# Patient Record
Sex: MALE | Race: BLACK OR AFRICAN AMERICAN | NOT HISPANIC OR LATINO | URBAN - METROPOLITAN AREA
[De-identification: names, ages, dates, MRNs, and addresses within clinical notes are randomized per-mention and may not be internally consistent; named-entity substitution may affect disease eponyms.]

---

## 2018-04-05 ENCOUNTER — TRANSCRIBE ORDERS (OUTPATIENT)
Dept: PHYSICAL THERAPY | Facility: CLINIC | Age: 63
End: 2018-04-05

## 2018-04-05 DIAGNOSIS — S86.912A KNEE STRAIN, LEFT, INITIAL ENCOUNTER: Primary | ICD-10-CM

## 2018-04-09 ENCOUNTER — TREATMENT (OUTPATIENT)
Dept: PHYSICAL THERAPY | Facility: CLINIC | Age: 63
End: 2018-04-09

## 2018-04-09 DIAGNOSIS — M25.562 ACUTE PAIN OF LEFT KNEE: Primary | ICD-10-CM

## 2018-04-09 PROCEDURE — 97110 THERAPEUTIC EXERCISES: CPT | Performed by: PHYSICAL THERAPIST

## 2018-04-09 PROCEDURE — 97161 PT EVAL LOW COMPLEX 20 MIN: CPT | Performed by: PHYSICAL THERAPIST

## 2018-04-09 NOTE — PROGRESS NOTES
Physical Therapy Initial Evaluation and Plan of Care        Subjective Evaluation    History of Present Illness  Mechanism of injury: Patient reports that he fell when coming down a ladder at work.  He continued to work the remainder of his shift but his knee continued to hurt.   He currently has difficulty with squatting and crouching.  Has to change how he is climbing a ladder.  Denies giving way but does have significant tenderness on the outside of his knee which is keeping him up at night.             Patient Occupation:     Precautions and Work Restrictions: work as tolerated. Quality of life: good    Pain  Relieving factors: ice and medications  Aggravating factors: squatting, stairs, ambulation, standing and repetitive movement (ladder climbing)  Progression: improved    Social Support  Lives with: alone    Treatments  No previous or current treatments           Objective       Tenderness   Left Knee   Tenderness in the fibular head, lateral joint line and LCL (distal).     Active Range of Motion   Left Knee   Flexion: 130 degrees   Extension: 0 degrees     Strength/Myotome Testing     Left Knee   Normal strength  Quadriceps contraction: good    Tests     Left Knee   Positive valgus stress test at 30 degrees and varus stress test at 30 degrees.   Negative lateral Iwona and medial Iwona.          Assessment & Plan     Assessment  Impairments: abnormal or restricted ROM, activity intolerance, lacks appropriate home exercise program and pain with function  Assessment details: Patient is a 62 year old male who presents with increased left knee pain after work related injury. Upon assessment he has full AROM of left knee, good quad set and 5/5 MMT.  He has significant tenderness at proximal fibular head with lateral joint line.  Negative testing for meniscal involvement however test positive for varus and valgus stress.    Prognosis: good  Prognosis details: Long Term Goals (4  weeks)    Patient is able to return to work/community activities with minimal to no discomfort  Patient is able to lift 45 lbs from floor to waist  Patient is able stand for as long as needed for work/community related tasks.   Patient is able to sit for as long as needed for work/community related tasks.   Patient is able to reciprocally climb steps as needed for daily tasks.   Patient is able to squat/crouch without issue.   Patient is able to safely climb ladder.         Short Term Goals (2 weeks)    Patient is independent with HEP  Patient is able to sleep without being disrupted by pain.   Patient has greater than 50% improvement overall.   Minimal tenderness at lateral joint line.             Functional Limitations: sleeping, moving in bed and stooping  Plan  Therapy options: will be seen for skilled physical therapy services  Planned modality interventions: TENS, ultrasound, thermotherapy (hydrocollator packs) and cryotherapy  Planned therapy interventions: manual therapy, soft tissue mobilization, strengthening, stretching, therapeutic activities, joint mobilization, home exercise program, functional ROM exercises, flexibility and body mechanics training  Frequency: 3x week  Duration in weeks: 4  Treatment plan discussed with: patient          Therapeutic Exercise:    18     mins  30276;     Iontophoresis               2   mins [id number]    Timed Treatment:   20   mins   Total Treatment:     50   mins    PT SIGNATURE: Roxanne Mendez, PT   DATE TREATMENT INITIATED: 4/9/2018    Initial Certification  Certification Period: 7/8/2018  I certify that the therapy services are furnished while this patient is under my care.  The services outlined above are required by this patient, and will be reviewed every 90 days.     PHYSICIAN: Roz Hoang PA-C      DATE:     Please sign and return via fax to [phone].. Thank you, [facility].

## 2018-04-11 ENCOUNTER — TREATMENT (OUTPATIENT)
Dept: PHYSICAL THERAPY | Facility: CLINIC | Age: 63
End: 2018-04-11

## 2018-04-11 DIAGNOSIS — M25.562 ACUTE PAIN OF LEFT KNEE: Primary | ICD-10-CM

## 2018-04-11 PROCEDURE — 97110 THERAPEUTIC EXERCISES: CPT | Performed by: PHYSICAL THERAPIST

## 2018-04-11 NOTE — PROGRESS NOTES
Physical Therapy Daily Progress Note            Subjective Evaluation    History of Present Illness    Subjective comment: Patient reports that he had some relief with the ionto patch.  Knee still feels weak and has pain at medial and lateral joint lines.        Objective   See Exercise, Manual, and Modality Logs for complete treatment.       Assessment & Plan     Assessment  Assessment details: Patient tolerated treatment well.  Near full AROM however has some mild tightness at end range extension and flexion.   Has increased tenderness at lateral joint line and tibial plateau.     Plan  Plan details: Progress as tolerated.  Reassess at next appointment.                        Therapeutic Exercise:    40     mins  61743;       Timed Treatment:   40   mins   Total Treatment:     52   mins    Roxanne Mendez, PT  Physical Therapist

## 2018-04-13 ENCOUNTER — TREATMENT (OUTPATIENT)
Dept: PHYSICAL THERAPY | Facility: CLINIC | Age: 63
End: 2018-04-13

## 2018-04-13 DIAGNOSIS — M25.562 ACUTE PAIN OF LEFT KNEE: Primary | ICD-10-CM

## 2018-04-13 PROCEDURE — 97110 THERAPEUTIC EXERCISES: CPT | Performed by: PHYSICAL THERAPIST

## 2018-04-13 PROCEDURE — 97112 NEUROMUSCULAR REEDUCATION: CPT | Performed by: PHYSICAL THERAPIST

## 2018-04-13 NOTE — PROGRESS NOTES
Re-Assessment / Re-Certification        Patient: Dre Veloz   : 1955  Diagnosis/ICD-10 Code:  Acute pain of left knee [M25.562]  Referring practitioner: Roz Hoang PA-C  Patient seen for 3 sessions      Subjective:   Dre Veloz reports:  Clinical Progress: unchanged  Home Program Compliance: Yes  Treatment has included: therapeutic exercise, neuromuscular re-education, iontophoresis and cryotherapy    Subjective Evaluation    History of Present Illness    Subjective comment: the pt reports he has a numbness feeling into his L lateral lower leg and has pain with prolong standing or performing a prolong activity.       Objective       Active Range of Motion     Additional Active Range of Motion Details  2018 findings:  Left Knee   Flexion: 130 degrees   Extension: 0 degrees     Palpation for tenderness: + with minimal depth of palpation along the medial and lateral knee and knee joint line, down the tibial shaft, and along the lateral and proximal lower leg.    Initial evaluation findings:  Left Knee   Flexion: 130 degrees   Extension: 0 degrees      Assessment & Plan     Assessment  Assessment details: The pt has made minimal improvements since his initial evaluation without change in ROM, continued palpation for tenderness around the knee and proximal lower leg, inability to complete work duties fully and pain with prolong activities: standing, walking, sitting, climbing stairs and ladder.   The pt reports there was subjective improvement in his abilities to perform some tasks at work better, but his pain relapsed and his is now about 30% improved from his initial condition.    The pt would continue to benefit from PT care to address his pain, lack in activity endurance, and weakness to perform work duties.  Prognosis details: Short Term Goals (2 weeks)    Patient is independent with HEP. MET  Patient is able to sleep without being disrupted by pain.  NOT MET, waking up 2-3x/night due to  pain  Patient has greater than 50% improvement overall. NOT MET, 30%  Minimal tenderness at lateral joint line.  NOT MET    Long Term Goals (4 weeks)    Patient is able to return to work/community activities with minimal to no discomfort. NOT MET  Patient is able to lift 45 lbs from floor to waist. NOT MET  Patient is able stand for as long as needed for work/community related tasks. NOT MET  Patient is able to sit for as long as needed for work/community related tasks. NOT MET  Patient is able to reciprocally climb steps as needed for daily tasks. NOT MET  Patient is able to squat/crouch without issue. NOT MET  Patient is able to safely climb ladder. NOT MET    Plan  Plan details: Continue skilled PT care to address functional limitations to return the pt to his PLOF, as appropriate.        Progress toward previous goals: Partially Met  Recommendations: Continue as planned  Timeframe: 1 month  Prognosis to achieve goals: good    PT Signature: Belen Rios PT      Based upon review of the patient's progress and continued therapy plan, it is my medical opinion that Dre Veloz should continue physical therapy treatment at [facility]  [address] [place] [id number] [phone].    Signature: __________________________________  Roz Hoang PA-C    Manual Therapy:    0     mins  96315;  Therapeutic Exercise:    20     mins  57748;     Neuromuscular Sonja:    32    mins  13276;    Therapeutic Activity:     0     mins  26647;     Gait Trainin     mins  46981;     Ultrasound:     0     mins  42022;    Work Hardening           0      mins 77427  Iontophoresis               3   mins 14400    Timed Treatment:   55   mins   Total Treatment:     65   mins

## 2018-04-17 ENCOUNTER — TREATMENT (OUTPATIENT)
Dept: PHYSICAL THERAPY | Facility: CLINIC | Age: 63
End: 2018-04-17

## 2018-04-17 DIAGNOSIS — M25.562 ACUTE PAIN OF LEFT KNEE: Primary | ICD-10-CM

## 2018-04-17 PROCEDURE — 97140 MANUAL THERAPY 1/> REGIONS: CPT | Performed by: PHYSICAL THERAPIST

## 2018-04-17 PROCEDURE — 97110 THERAPEUTIC EXERCISES: CPT | Performed by: PHYSICAL THERAPIST

## 2018-04-17 NOTE — PROGRESS NOTES
Physical Therapy Daily Progress Note            Subjective Evaluation    History of Present Illness    Subjective comment: patient reports that overall he feels as thought it is getting better but still has pain with twisting        Objective   See Exercise, Manual, and Modality Logs for complete treatment.       Assessment & Plan     Assessment  Assessment details: Patient tolerated treatment fairly well. She continues to have increased tenderness at proximal fibular head.  Had some fatigue at quad with SLR.                      Manual Therapy:    5     mins  41475;  Therapeutic Exercise:    45     mins  05680;       Iontophoresis               2   mins 58288    Timed Treatment:   52   mins   Total Treatment:     52   mins    Roxanne Mendez, PT  Physical Therapist

## 2018-04-18 ENCOUNTER — TREATMENT (OUTPATIENT)
Dept: PHYSICAL THERAPY | Facility: CLINIC | Age: 63
End: 2018-04-18

## 2018-04-18 DIAGNOSIS — M25.562 ACUTE PAIN OF LEFT KNEE: Primary | ICD-10-CM

## 2018-04-18 PROCEDURE — 97110 THERAPEUTIC EXERCISES: CPT | Performed by: PHYSICAL THERAPIST

## 2018-04-18 PROCEDURE — 97112 NEUROMUSCULAR REEDUCATION: CPT | Performed by: PHYSICAL THERAPIST

## 2018-04-18 PROCEDURE — 97140 MANUAL THERAPY 1/> REGIONS: CPT | Performed by: PHYSICAL THERAPIST

## 2018-04-18 NOTE — PROGRESS NOTES
"Physical Therapy Daily Progress Note            Subjective Evaluation    History of Present Illness    Subjective comment: The pt continues to report \"static\" improvement in his L knee pain.  The pt reports frustration in his lack of physical progress and continued pain.  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       Assessment & Plan     Assessment  Assessment details: The pt's knee pain was made slightly better after performing stretching between exercises.  The pt did report a slight improvement in his pain overall, but it was still not completely eliminated.  The pt left his appointment with reports he felt better than when he first came in.  The pt did inquire if it was ok for him to walk more and was told as long as he avoids pain and takes breaks as needed, there is not observable reason why he could not begin a gentle walking program.  The pt verbally agreed to keep the walking pain free.      Plan  Plan details: Continue skilled PT care to address functional limitations to return the pt to his PLOF, as appropriate.                       Manual Therapy:    11     mins  43974;  Therapeutic Exercise:    20     mins  18617;     Neuromuscular Sonja:    22    mins  72296;    Therapeutic Activity:     0     mins  59979;     Gait Trainin     mins  40680;     Ultrasound:     0     mins  79948;    Work Hardening           0      mins 78032  Iontophoresis               0   mins 08810    Timed Treatment:   53   mins   Total Treatment:     63   mins    Belen Rios, PT  Physical Therapist  "

## 2018-04-20 ENCOUNTER — TREATMENT (OUTPATIENT)
Dept: PHYSICAL THERAPY | Facility: CLINIC | Age: 63
End: 2018-04-20

## 2018-04-20 DIAGNOSIS — M25.562 ACUTE PAIN OF LEFT KNEE: Primary | ICD-10-CM

## 2018-04-20 PROCEDURE — 97110 THERAPEUTIC EXERCISES: CPT | Performed by: PHYSICAL THERAPIST

## 2018-04-20 PROCEDURE — 97112 NEUROMUSCULAR REEDUCATION: CPT | Performed by: PHYSICAL THERAPIST

## 2018-04-20 PROCEDURE — 97530 THERAPEUTIC ACTIVITIES: CPT | Performed by: PHYSICAL THERAPIST

## 2018-04-20 NOTE — PROGRESS NOTES
"Re-Assessment / Re-Certification        Patient: Dre Veloz   : 1955  Diagnosis/ICD-10 Code:  Acute pain of left knee [M25.562]  Referring practitioner: Roz Hoang PA-C  Patient seen for 6 sessions      Subjective:   Dre Veloz reports:  Clinical Progress: improved  Home Program Compliance: Yes  Treatment has included: therapeutic exercise, neuromuscular re-education, manual therapy, therapeutic activity and cryotherapy    Subjective Evaluation    History of Present Illness    Subjective comment: The pt continues to report \"static\" improvement in his L knee pain.  The pt reports frustration in his lack of physical progress and continued pain.  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 may have reaggravated his      Objective       Active Range of Motion     Additional Active Range of Motion Details  2018 findings:  Knee flex: 141 deg, sharp pain along lateral side of knee  Knee ext: 0 deg, sharp pain at area below lateral knee and a dull ache in medial knee    + for palpation with deep depth along knee joint line, at distal hamstring tendons, along distal ITB distribution and distal adductor muscle bellies    + Valgus and Varus at 0 deg  + Valgus at 30 deg, slight pain  - Varous at 30 deg      Initial Evaluation findings:  Tenderness   Left Knee   Tenderness in the fibular head, lateral joint line and LCL (distal).      Active Range of Motion   Left Knee   Flexion: 130 degrees   Extension: 0 degrees      Strength/Myotome Testing      Left Knee   Normal strength  Quadriceps contraction: good     Tests      Left Knee   Positive valgus stress test at 30 degrees and varus stress test at 30 degrees.   Negative lateral Iwona and medial Iwona.      Assessment & Plan     Assessment  Assessment details: The pt has improved slightly since his initial evaluation with an increase in knee flexion and reports he can sit as long as needed, but finds it more " comfortable to keep the knee straight vs bent.  The pt also demo the ability to complete gradually more exercises at each appointment.      However, the pt did report his knee pain continues to limit his abilities to complete all his work tasks and personal movements.  The pt demo in the clinic the ability to properly transfer 15# and 20# from waist <-> floor after visual and verbal cues, but the pt reported it increased his knee pain.  The pt also demo he could climb a ladder, but following his subjective report of needing to lower himself to the ground to enter into the ladder's cage, the pt reported it increased his pains.  Additionally, the pt reports he can stand for up to 3 hours, but at his PLOF was able to stand for 12 hours for work duties.    The pt would benefit from continued PT care to address his limitations for safety and strength needed for his work duties.  Prognosis details: Long Term Goals (4 weeks)    Patient is able to return to work/community activities with minimal to no discomfort. NO TMET  Patient is able to lift 45 lbs from floor to waist  Patient is able stand for as long as needed for work/community related tasks. NOT MET, only able to stand for 3 hours, reports he was standing for 12 hours.  Patient is able to sit for as long as needed for work/community related tasks. MET, but needs to keep the leg straight or moving to prevent it from feeling stiff.  Patient is able to reciprocally climb steps as needed for daily tasks. NOT MET, may start off with reciprocal amb, but will resort back to step-to-step  Patient is able to squat/crouch without issue. NOT MET  Patient is able to safely climb ladder. MET        Short Term Goals (2 weeks)    Patient is independent with HEP. MET  Patient is able to sleep without being disrupted by pain. NOT MET, 2-3 hrs of sleep before being woken up  Patient has greater than 50% improvement overall. NOT MET, subjective report 45%  Minimal tenderness at  lateral joint line.  NOT MET    Plan  Plan details: Please advise.      Progress toward previous goals: Partially Met  Recommendations: Continue as planned    PT Signature: Belen Rios PT      Based upon review of the patient's progress and continued therapy plan, it is my medical opinion that Dre Veloz should continue physical therapy treatment at [facility]  [address] [place] [id number] [phone].    Signature: __________________________________  Roz Hoang PA-C    Manual Therapy:    0     mins  44104;  Therapeutic Exercise:    32     mins  71738;     Neuromuscular Sonja:    8    mins  35092;    Therapeutic Activity:     8     mins  46136;     Gait Trainin     mins  65268;     Ultrasound:     0     mins  11153;    Work Hardening           0      mins 69793  Iontophoresis               3   mins 32088    Timed Treatment:   60   mins   Total Treatment:     70   mins

## 2022-09-17 ENCOUNTER — HOSPITAL ENCOUNTER (EMERGENCY)
Dept: HOSPITAL 15 - ER | Age: 67
LOS: 1 days | Discharge: ANOTHER HOSPITAL | End: 2022-09-18
Payer: COMMERCIAL

## 2022-09-17 VITALS — HEIGHT: 68 IN | WEIGHT: 149.91 LBS | BODY MASS INDEX: 22.72 KG/M2

## 2022-09-17 DIAGNOSIS — K80.20: ICD-10-CM

## 2022-09-17 DIAGNOSIS — R09.89: ICD-10-CM

## 2022-09-17 DIAGNOSIS — I10: ICD-10-CM

## 2022-09-17 DIAGNOSIS — I71.02: Primary | ICD-10-CM

## 2022-09-17 DIAGNOSIS — D64.9: ICD-10-CM

## 2022-09-17 DIAGNOSIS — Z86.73: ICD-10-CM

## 2022-09-17 DIAGNOSIS — Z86.79: ICD-10-CM

## 2022-09-17 DIAGNOSIS — Z20.822: ICD-10-CM

## 2022-09-17 LAB
ALBUMIN SERPL-MCNC: 2.5 G/DL (ref 3.4–5)
ALP SERPL-CCNC: 92 U/L (ref 45–117)
ALT SERPL-CCNC: 29 U/L (ref 16–61)
ANION GAP SERPL CALCULATED.3IONS-SCNC: 7 MMOL/L (ref 5–15)
APTT PPP: 31.1 SEC (ref 24.6–33.4)
BILIRUB SERPL-MCNC: 0.3 MG/DL (ref 0.2–1)
BUN SERPL-MCNC: 22 MG/DL (ref 7–18)
BUN/CREAT SERPL: 20.6
CALCIUM SERPL-MCNC: 8.9 MG/DL (ref 8.5–10.1)
CHLORIDE SERPL-SCNC: 107 MMOL/L (ref 98–107)
CO2 SERPL-SCNC: 27 MMOL/L (ref 21–32)
GLUCOSE SERPL-MCNC: 125 MG/DL (ref 74–106)
HCT VFR BLD AUTO: 25.2 % (ref 41–53)
HGB BLD-MCNC: 8.1 G/DL (ref 13.5–17.5)
INR PPP: 1.16 (ref 0.9–1.15)
LIPASE SERPL-CCNC: 125 U/L (ref 73–393)
MAGNESIUM SERPL-MCNC: 2.3 MG/DL (ref 1.6–2.6)
MCH RBC QN AUTO: 27.6 PG (ref 28–32)
MCV RBC AUTO: 85.7 FL (ref 80–100)
NRBC BLD QL AUTO: 0 %
POTASSIUM SERPL-SCNC: 3.5 MMOL/L (ref 3.5–5.1)
PROT SERPL-MCNC: 7.2 G/DL (ref 6.4–8.2)
SODIUM SERPL-SCNC: 141 MMOL/L (ref 136–145)

## 2022-09-17 PROCEDURE — 71045 X-RAY EXAM CHEST 1 VIEW: CPT

## 2022-09-17 PROCEDURE — 87426 SARSCOV CORONAVIRUS AG IA: CPT

## 2022-09-17 PROCEDURE — 85610 PROTHROMBIN TIME: CPT

## 2022-09-17 PROCEDURE — 96368 THER/DIAG CONCURRENT INF: CPT

## 2022-09-17 PROCEDURE — 83690 ASSAY OF LIPASE: CPT

## 2022-09-17 PROCEDURE — 85730 THROMBOPLASTIN TIME PARTIAL: CPT

## 2022-09-17 PROCEDURE — 36415 COLL VENOUS BLD VENIPUNCTURE: CPT

## 2022-09-17 PROCEDURE — 96366 THER/PROPH/DIAG IV INF ADDON: CPT

## 2022-09-17 PROCEDURE — 74175 CTA ABDOMEN W/CONTRAST: CPT

## 2022-09-17 PROCEDURE — 86850 RBC ANTIBODY SCREEN: CPT

## 2022-09-17 PROCEDURE — 85025 COMPLETE CBC W/AUTO DIFF WBC: CPT

## 2022-09-17 PROCEDURE — 86901 BLOOD TYPING SEROLOGIC RH(D): CPT

## 2022-09-17 PROCEDURE — 96375 TX/PRO/DX INJ NEW DRUG ADDON: CPT

## 2022-09-17 PROCEDURE — 96376 TX/PRO/DX INJ SAME DRUG ADON: CPT

## 2022-09-17 PROCEDURE — 86900 BLOOD TYPING SEROLOGIC ABO: CPT

## 2022-09-17 PROCEDURE — 96365 THER/PROPH/DIAG IV INF INIT: CPT

## 2022-09-17 PROCEDURE — 93005 ELECTROCARDIOGRAM TRACING: CPT

## 2022-09-17 PROCEDURE — 84484 ASSAY OF TROPONIN QUANT: CPT

## 2022-09-17 PROCEDURE — 80053 COMPREHEN METABOLIC PANEL: CPT

## 2022-09-17 PROCEDURE — 81001 URINALYSIS AUTO W/SCOPE: CPT

## 2022-09-17 PROCEDURE — 99285 EMERGENCY DEPT VISIT HI MDM: CPT

## 2022-09-17 PROCEDURE — 83735 ASSAY OF MAGNESIUM: CPT

## 2022-09-17 RX ADMIN — ESMOLOL HYDROCHLORIDE SCH MLS/HR: 10 INJECTION INTRAVENOUS at 21:30

## 2022-09-18 VITALS — DIASTOLIC BLOOD PRESSURE: 57 MMHG | SYSTOLIC BLOOD PRESSURE: 137 MMHG

## 2022-09-18 RX ADMIN — ESMOLOL HYDROCHLORIDE SCH MLS/HR: 10 INJECTION INTRAVENOUS at 09:46
